# Patient Record
Sex: FEMALE | Race: WHITE | URBAN - METROPOLITAN AREA
[De-identification: names, ages, dates, MRNs, and addresses within clinical notes are randomized per-mention and may not be internally consistent; named-entity substitution may affect disease eponyms.]

---

## 2017-08-23 ENCOUNTER — SPOT CHECK FOLLOW-UP (OUTPATIENT)
Dept: URBAN - METROPOLITAN AREA CLINIC 14 | Facility: CLINIC | Age: 67
Setting detail: DERMATOLOGY
End: 2017-08-23

## 2017-08-23 DIAGNOSIS — L57.0 ACTINIC KERATOSIS: ICD-10-CM

## 2017-08-23 PROCEDURE — 17000 DESTRUCT PREMALG LESION: CPT

## 2017-10-20 ENCOUNTER — FOLLOW-UP (OUTPATIENT)
Dept: URBAN - METROPOLITAN AREA CLINIC 14 | Facility: CLINIC | Age: 67
Setting detail: DERMATOLOGY
End: 2017-10-20

## 2017-10-20 PROBLEM — Z85828 V10.83: Status: ACTIVE | Noted: 2017-10-20

## 2017-10-20 PROCEDURE — 99213 OFFICE O/P EST LOW 20 MIN: CPT

## 2017-10-20 PROCEDURE — 17110 DESTRUCT B9 LESION 1-14: CPT

## 2017-10-20 PROCEDURE — 11100 BX SKIN SUBCUTANEOUS&/MUCOUS MEMBRANE 1 LESION: CPT

## 2018-10-11 ENCOUNTER — FOLLOW-UP (OUTPATIENT)
Dept: URBAN - METROPOLITAN AREA CLINIC 14 | Facility: CLINIC | Age: 68
Setting detail: DERMATOLOGY
End: 2018-10-11

## 2018-10-11 DIAGNOSIS — Q82.8 OTHER SPECIFIED CONGENITAL MALFORMATIONS OF SKIN: ICD-10-CM

## 2018-10-11 PROBLEM — Z85828 V10.83: Status: ACTIVE | Noted: 2018-10-11

## 2018-10-11 PROCEDURE — 99213 OFFICE O/P EST LOW 20 MIN: CPT

## 2018-10-11 RX ORDER — BETAMETHASONE DIPROPIONATE 0.5 MG/ML
1 APPLICATION LOTION, AUGMENTED TOPICAL 2 TIMES WEEKLY
Qty: 60 | Refills: 3 | Status: DISCONTINUED
Start: 2018-10-11 | End: 2018-10-15

## 2018-10-15 ENCOUNTER — RX ONLY (RX ONLY)
Age: 68
End: 2018-10-15

## 2018-10-15 RX ORDER — BETAMETHASONE DIPROPIONATE 0.5 MG/ML
1 APPLICATION LOTION, AUGMENTED TOPICAL 2 TIMES WEEKLY
Qty: 60 | Refills: 3 | Status: DISCONTINUED
Start: 2018-10-15 | End: 2019-10-30

## 2019-10-18 RX ORDER — CLINDAMYCIN PHOSPHATE AND TRETINOIN 10; .25 MG/G; MG/G
1 APPLICATION GEL TOPICAL QHS
Qty: 60 | Refills: 6
Start: 2019-10-18

## 2019-10-30 ENCOUNTER — SKIN CHECK (OUTPATIENT)
Dept: URBAN - METROPOLITAN AREA CLINIC 14 | Facility: CLINIC | Age: 69
Setting detail: DERMATOLOGY
End: 2019-10-30

## 2019-10-30 ENCOUNTER — RX ONLY (RX ONLY)
Age: 69
End: 2019-10-30

## 2019-10-30 DIAGNOSIS — L57.0 ACTINIC KERATOSIS: ICD-10-CM

## 2019-10-30 PROCEDURE — 17110 DESTRUCT B9 LESION 1-14: CPT

## 2019-10-30 PROCEDURE — 99214 OFFICE O/P EST MOD 30 MIN: CPT

## 2019-10-30 RX ORDER — CICLOPIROX 1 G/100ML
1 APPLICATION SHAMPOO TOPICAL 2 TIMES WEEKLY
Qty: 120 | Refills: 2
Start: 2019-10-30

## 2019-10-30 RX ORDER — CLOBETASOL PROPIONATE 0.5 MG/G
1 APPLICATION AEROSOL, FOAM TOPICAL ADD'L SIG
Qty: 100 | Refills: 2
Start: 2019-10-30

## 2020-01-02 ENCOUNTER — RX ONLY (RX ONLY)
Age: 70
End: 2020-01-02

## 2020-01-02 RX ORDER — BETAMETHASONE DIPROPIONATE 0.5 MG/ML
1 APPLICATION LOTION, AUGMENTED TOPICAL 2 TIMES WEEKLY
Qty: 60 | Refills: 2
Start: 2020-01-02

## 2020-11-19 ENCOUNTER — OTHER- (OUTPATIENT)
Dept: URBAN - METROPOLITAN AREA CLINIC 14 | Facility: CLINIC | Age: 70
Setting detail: DERMATOLOGY
End: 2020-11-19

## 2020-11-19 DIAGNOSIS — D04.39 CARCINOMA IN SITU OF SKIN OF OTHER PARTS OF FACE: ICD-10-CM

## 2020-11-19 PROBLEM — Z85828 V10.83: Status: ACTIVE | Noted: 2020-11-19

## 2020-11-19 PROCEDURE — 99214 OFFICE O/P EST MOD 30 MIN: CPT

## 2020-11-19 PROCEDURE — 17000 DESTRUCT PREMALG LESION: CPT

## 2021-09-02 ENCOUNTER — SKIN CHECK (OUTPATIENT)
Dept: URBAN - METROPOLITAN AREA CLINIC 14 | Facility: CLINIC | Age: 71
Setting detail: DERMATOLOGY
End: 2021-09-02

## 2021-09-02 DIAGNOSIS — L90.5 SCAR CONDITIONS AND FIBROSIS OF SKIN: ICD-10-CM

## 2021-09-02 PROCEDURE — 17110 DESTRUCT B9 LESION 1-14: CPT

## 2021-09-02 PROCEDURE — 99213 OFFICE O/P EST LOW 20 MIN: CPT

## 2022-06-17 ENCOUNTER — RX ONLY (RX ONLY)
Age: 72
End: 2022-06-17

## 2022-06-17 RX ORDER — BETAMETHASONE DIPROPIONATE 0.5 MG/ML
A SMALL AMOUNT LOTION, AUGMENTED TOPICAL TWICE A DAY
Qty: 60 | Refills: 1
Start: 2022-06-17

## 2023-02-23 ENCOUNTER — APPOINTMENT (OUTPATIENT)
Dept: URBAN - METROPOLITAN AREA CLINIC 210 | Age: 73
Setting detail: DERMATOLOGY
End: 2023-03-02

## 2023-02-23 DIAGNOSIS — L57.0 ACTINIC KERATOSIS: ICD-10-CM

## 2023-02-23 DIAGNOSIS — L82.0 INFLAMED SEBORRHEIC KERATOSIS: ICD-10-CM

## 2023-02-23 PROCEDURE — 17110 DESTRUCT B9 LESION 1-14: CPT

## 2023-02-23 PROCEDURE — OTHER COUNSELING: OTHER

## 2023-02-23 PROCEDURE — OTHER MIPS QUALITY: OTHER

## 2023-02-23 PROCEDURE — OTHER LIQUID NITROGEN: OTHER

## 2023-02-23 PROCEDURE — 17000 DESTRUCT PREMALG LESION: CPT | Mod: 59

## 2023-02-23 ASSESSMENT — LOCATION DETAILED DESCRIPTION DERM
LOCATION DETAILED: RIGHT MEDIAL MALAR CHEEK
LOCATION DETAILED: LEFT POSTERIOR NECK

## 2023-02-23 ASSESSMENT — LOCATION ZONE DERM
LOCATION ZONE: NECK
LOCATION ZONE: FACE

## 2023-02-23 ASSESSMENT — LOCATION SIMPLE DESCRIPTION DERM
LOCATION SIMPLE: POSTERIOR NECK
LOCATION SIMPLE: RIGHT CHEEK

## 2023-02-23 NOTE — PROCEDURE: MIPS QUALITY
Quality 226: Preventive Care And Screening: Tobacco Use: Screening And Cessation Intervention: Patient screened for tobacco use and is an ex/non-smoker
Quality 111:Pneumonia Vaccination Status For Older Adults: Pneumococcal vaccine (PPSV23) was not administered on or after patient’s 60th birthday and before the end of the measurement period, reason not otherwise specified
Detail Level: Detailed
Quality 130: Documentation Of Current Medications In The Medical Record: Current Medications Documented
Quality 110: Preventive Care And Screening: Influenza Immunization: Influenza Immunization Administered during Influenza season
Quality 431: Preventive Care And Screening: Unhealthy Alcohol Use - Screening: Patient not identified as an unhealthy alcohol user when screened for unhealthy alcohol use using a systematic screening method
Quality 47: Advance Care Plan: Advance Care Planning discussed and documented in the medical record; patient did not wish or was not able to name a surrogate decision maker or provide an advance care plan.

## 2024-05-20 ENCOUNTER — APPOINTMENT (OUTPATIENT)
Dept: URBAN - NONMETROPOLITAN AREA CLINIC 46 | Age: 74
Setting detail: DERMATOLOGY
End: 2024-05-23

## 2024-05-20 DIAGNOSIS — L81.4 OTHER MELANIN HYPERPIGMENTATION: ICD-10-CM

## 2024-05-20 DIAGNOSIS — L82.0 INFLAMED SEBORRHEIC KERATOSIS: ICD-10-CM

## 2024-05-20 DIAGNOSIS — L82.1 OTHER SEBORRHEIC KERATOSIS: ICD-10-CM

## 2024-05-20 PROCEDURE — OTHER COUNSELING: OTHER

## 2024-05-20 PROCEDURE — 99213 OFFICE O/P EST LOW 20 MIN: CPT | Mod: 25

## 2024-05-20 PROCEDURE — OTHER MIPS QUALITY: OTHER

## 2024-05-20 PROCEDURE — OTHER LIQUID NITROGEN: OTHER

## 2024-05-20 PROCEDURE — 17110 DESTRUCT B9 LESION 1-14: CPT

## 2024-05-20 ASSESSMENT — LOCATION DETAILED DESCRIPTION DERM
LOCATION DETAILED: INFERIOR THORACIC SPINE
LOCATION DETAILED: RIGHT LATERAL TRAPEZIAL NECK
LOCATION DETAILED: RIGHT MEDIAL UPPER BACK
LOCATION DETAILED: RIGHT MEDIAL SUPERIOR CHEST
LOCATION DETAILED: LEFT MEDIAL UPPER BACK
LOCATION DETAILED: RIGHT SUPERIOR MEDIAL UPPER BACK
LOCATION DETAILED: SUPERIOR THORACIC SPINE
LOCATION DETAILED: LEFT LATERAL SUPERIOR CHEST
LOCATION DETAILED: LEFT LATERAL SUPERIOR CHEST

## 2024-05-20 ASSESSMENT — LOCATION SIMPLE DESCRIPTION DERM
LOCATION SIMPLE: UPPER BACK
LOCATION SIMPLE: POSTERIOR NECK
LOCATION SIMPLE: CHEST
LOCATION SIMPLE: LEFT UPPER BACK
LOCATION SIMPLE: RIGHT UPPER BACK
LOCATION SIMPLE: CHEST

## 2024-05-20 ASSESSMENT — LOCATION ZONE DERM
LOCATION ZONE: TRUNK
LOCATION ZONE: NECK
LOCATION ZONE: TRUNK

## 2024-05-20 NOTE — PROCEDURE: LIQUID NITROGEN
Application Tool (Optional): Cry-AC
Spray Paint Text: The liquid nitrogen was applied to the skin utilizing a spray paint frosting technique.
Spray Paint Technique: No
Medical Necessity Information: It is in your best interest to select a reason for this procedure from the list below. All of these items fulfill various CMS LCD requirements except the new and changing color options.
Include Z78.9 (Other Specified Conditions Influencing Health Status) As An Associated Diagnosis?: Yes
Number Of Freeze-Thaw Cycles: 1 freeze-thaw cycle
Post-Care Instructions: I reviewed with the patient in detail post-care instructions. Patient is to wear sunprotection, and avoid picking at any of the treated lesions. Pt may apply Vaseline to crusted or scabbing areas.
Medical Necessity Clause: This procedure was medically necessary because the lesions that were treated were:
Detail Level: Detailed
Consent: The patient's consent was obtained including but not limited to risks of crusting, scabbing, blistering, scarring, darker or lighter pigmentary change, recurrence, incomplete removal and infection.
Duration Of Freeze Thaw-Cycle (Seconds): 5-10
Pared With?: 15 blade

## 2024-09-03 ENCOUNTER — APPOINTMENT (OUTPATIENT)
Dept: URBAN - METROPOLITAN AREA CLINIC 210 | Age: 74
Setting detail: DERMATOLOGY
End: 2024-09-03

## 2024-09-03 DIAGNOSIS — L81.4 OTHER MELANIN HYPERPIGMENTATION: ICD-10-CM

## 2024-09-03 DIAGNOSIS — L82.1 OTHER SEBORRHEIC KERATOSIS: ICD-10-CM

## 2024-09-03 DIAGNOSIS — D22 MELANOCYTIC NEVI: ICD-10-CM

## 2024-09-03 DIAGNOSIS — L82.0 INFLAMED SEBORRHEIC KERATOSIS: ICD-10-CM

## 2024-09-03 DIAGNOSIS — D18.0 HEMANGIOMA: ICD-10-CM

## 2024-09-03 PROBLEM — D18.01 HEMANGIOMA OF SKIN AND SUBCUTANEOUS TISSUE: Status: ACTIVE | Noted: 2024-09-03

## 2024-09-03 PROBLEM — D22.5 MELANOCYTIC NEVI OF TRUNK: Status: ACTIVE | Noted: 2024-09-03

## 2024-09-03 PROBLEM — D23.71 OTHER BENIGN NEOPLASM OF SKIN OF RIGHT LOWER LIMB, INCLUDING HIP: Status: ACTIVE | Noted: 2024-09-03

## 2024-09-03 PROBLEM — D23.72 OTHER BENIGN NEOPLASM OF SKIN OF LEFT LOWER LIMB, INCLUDING HIP: Status: ACTIVE | Noted: 2024-09-03

## 2024-09-03 PROCEDURE — 99213 OFFICE O/P EST LOW 20 MIN: CPT | Mod: 25

## 2024-09-03 PROCEDURE — OTHER LIQUID NITROGEN: OTHER

## 2024-09-03 PROCEDURE — OTHER COUNSELING: OTHER

## 2024-09-03 PROCEDURE — 17110 DESTRUCT B9 LESION 1-14: CPT

## 2024-09-03 PROCEDURE — OTHER MIPS QUALITY: OTHER

## 2024-09-03 ASSESSMENT — LOCATION SIMPLE DESCRIPTION DERM
LOCATION SIMPLE: CHEST
LOCATION SIMPLE: RIGHT SHOULDER
LOCATION SIMPLE: LEFT SHOULDER
LOCATION SIMPLE: LEFT UPPER BACK
LOCATION SIMPLE: ABDOMEN
LOCATION SIMPLE: RIGHT FOREARM
LOCATION SIMPLE: UPPER BACK
LOCATION SIMPLE: LEFT POPLITEAL SKIN
LOCATION SIMPLE: RIGHT POPLITEAL SKIN
LOCATION SIMPLE: LEFT CALF

## 2024-09-03 ASSESSMENT — LOCATION DETAILED DESCRIPTION DERM
LOCATION DETAILED: LEFT MEDIAL UPPER BACK
LOCATION DETAILED: LEFT LATERAL POPLITEAL SKIN
LOCATION DETAILED: RIGHT POSTERIOR SHOULDER
LOCATION DETAILED: RIGHT PROXIMAL RADIAL DORSAL FOREARM
LOCATION DETAILED: LEFT POSTERIOR SHOULDER
LOCATION DETAILED: RIGHT LATERAL SUPERIOR CHEST
LOCATION DETAILED: RIGHT POPLITEAL SKIN
LOCATION DETAILED: RIGHT DISTAL DORSAL FOREARM
LOCATION DETAILED: LEFT PROXIMAL CALF
LOCATION DETAILED: LEFT ANTERIOR SHOULDER
LOCATION DETAILED: INFERIOR THORACIC SPINE
LOCATION DETAILED: EPIGASTRIC SKIN

## 2024-09-03 ASSESSMENT — LOCATION ZONE DERM
LOCATION ZONE: ARM
LOCATION ZONE: LEG
LOCATION ZONE: TRUNK

## 2025-09-03 ENCOUNTER — APPOINTMENT (OUTPATIENT)
Dept: URBAN - METROPOLITAN AREA CLINIC 210 | Age: 75
Setting detail: DERMATOLOGY
End: 2025-09-03

## 2025-09-03 DIAGNOSIS — L82.0 INFLAMED SEBORRHEIC KERATOSIS: ICD-10-CM

## 2025-09-03 DIAGNOSIS — L56.5 DISSEMINATED SUPERFICIAL ACTINIC POROKERATOSIS (DSAP): ICD-10-CM

## 2025-09-03 DIAGNOSIS — L57.8 OTHER SKIN CHANGES DUE TO CHRONIC EXPOSURE TO NONIONIZING RADIATION: ICD-10-CM

## 2025-09-03 DIAGNOSIS — D69.2 OTHER NONTHROMBOCYTOPENIC PURPURA: ICD-10-CM

## 2025-09-03 DIAGNOSIS — L82.1 OTHER SEBORRHEIC KERATOSIS: ICD-10-CM

## 2025-09-03 DIAGNOSIS — D22 MELANOCYTIC NEVI: ICD-10-CM

## 2025-09-03 DIAGNOSIS — D18.0 HEMANGIOMA: ICD-10-CM

## 2025-09-03 DIAGNOSIS — L57.0 ACTINIC KERATOSIS: ICD-10-CM

## 2025-09-03 DIAGNOSIS — L81.4 OTHER MELANIN HYPERPIGMENTATION: ICD-10-CM

## 2025-09-03 DIAGNOSIS — Z12.83 ENCOUNTER FOR SCREENING FOR MALIGNANT NEOPLASM OF SKIN: ICD-10-CM

## 2025-09-03 PROCEDURE — OTHER COUNSELING: OTHER

## 2025-09-03 PROCEDURE — OTHER MIPS QUALITY: OTHER

## 2025-09-03 PROCEDURE — OTHER LIQUID NITROGEN: OTHER

## 2025-09-03 ASSESSMENT — LOCATION SIMPLE DESCRIPTION DERM
LOCATION SIMPLE: LEFT UPPER BACK
LOCATION SIMPLE: LEFT PRETIBIAL REGION
LOCATION SIMPLE: CHEST
LOCATION SIMPLE: UPPER BACK
LOCATION SIMPLE: RIGHT UPPER BACK
LOCATION SIMPLE: RIGHT FOREARM
LOCATION SIMPLE: RIGHT PRETIBIAL REGION
LOCATION SIMPLE: LEFT FOREARM
LOCATION SIMPLE: RIGHT LOWER BACK
LOCATION SIMPLE: LEFT LIP

## 2025-09-03 ASSESSMENT — LOCATION DETAILED DESCRIPTION DERM
LOCATION DETAILED: RIGHT PROXIMAL DORSAL FOREARM
LOCATION DETAILED: LEFT MEDIAL UPPER BACK
LOCATION DETAILED: RIGHT MEDIAL SUPERIOR CHEST
LOCATION DETAILED: INFERIOR THORACIC SPINE
LOCATION DETAILED: RIGHT SUPERIOR MEDIAL MIDBACK
LOCATION DETAILED: RIGHT INFERIOR UPPER BACK
LOCATION DETAILED: LEFT PROXIMAL PRETIBIAL REGION
LOCATION DETAILED: RIGHT PROXIMAL PRETIBIAL REGION
LOCATION DETAILED: RIGHT MEDIAL UPPER BACK
LOCATION DETAILED: RIGHT DISTAL DORSAL FOREARM
LOCATION DETAILED: RIGHT INFERIOR LATERAL UPPER BACK
LOCATION DETAILED: LEFT SUPERIOR VERMILION LIP
LOCATION DETAILED: LEFT DISTAL DORSAL FOREARM
LOCATION DETAILED: LEFT PROXIMAL DORSAL FOREARM
LOCATION DETAILED: RIGHT SUPERIOR MEDIAL UPPER BACK

## 2025-09-03 ASSESSMENT — LOCATION ZONE DERM
LOCATION ZONE: LIP
LOCATION ZONE: ARM
LOCATION ZONE: TRUNK
LOCATION ZONE: LEG